# Patient Record
Sex: MALE | Race: OTHER | NOT HISPANIC OR LATINO | URBAN - METROPOLITAN AREA
[De-identification: names, ages, dates, MRNs, and addresses within clinical notes are randomized per-mention and may not be internally consistent; named-entity substitution may affect disease eponyms.]

---

## 2018-09-28 ENCOUNTER — EMERGENCY (EMERGENCY)
Facility: HOSPITAL | Age: 48
LOS: 1 days | Discharge: HOME | End: 2018-09-28
Admitting: EMERGENCY MEDICINE
Payer: COMMERCIAL

## 2018-09-28 VITALS
RESPIRATION RATE: 18 BRPM | TEMPERATURE: 98 F | WEIGHT: 149.91 LBS | SYSTOLIC BLOOD PRESSURE: 120 MMHG | HEART RATE: 72 BPM | OXYGEN SATURATION: 99 % | DIASTOLIC BLOOD PRESSURE: 83 MMHG

## 2018-09-28 DIAGNOSIS — S09.90XA UNSPECIFIED INJURY OF HEAD, INITIAL ENCOUNTER: ICD-10-CM

## 2018-09-28 DIAGNOSIS — S00.81XA ABRASION OF OTHER PART OF HEAD, INITIAL ENCOUNTER: ICD-10-CM
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
## 2018-09-28 PROCEDURE — 99283 EMERGENCY DEPT VISIT LOW MDM: CPT
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
## 2018-09-28 NOTE — ED ADULT NURSE NOTE - CHPI ED NUR SYMPTOMS NEG
no vomiting/no nausea/no blurred vision/no change in level of consciousness/no dizziness/no syncope/no weakness/no confusion/no seizure/no loss of consciousness

## 2018-09-28 NOTE — ED PROVIDER NOTE - NEUROLOGICAL, MLM
Patient is alert, oriented x person, place and time.  Cranial nerves 2-12 are intact.  Normal gait and speech.  Cerebellar testing normal:  negative Romberg, normal coordination and normal finger to nose, heal to shin and rapid alternating movements.  Normal sensory exam throughout.  No pronator drift.  5/5 bl upper extremity and lower extremity strength.

## 2018-09-28 NOTE — ED PROVIDER NOTE - CARE PROVIDERS DIRECT ADDRESSES
[email or web address]
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
## 2018-09-28 NOTE — ED PROVIDER NOTE - NSFOLLOWUPINSTRUCTIONS_ED_ALL_ED_FT
Please follow up with your primary care provider    Return immediately to ED if you have headache, nausea,  vomiting ,visual  changes, dizziness or any concerns.

## 2018-09-28 NOTE — ED PROVIDER NOTE - ENMT, MLM
Face: +abrasions to left side of face  Airway patent, Nasal mucosa clear. Mouth with normal mucosa. Throat has no vesicles, no oropharyngeal exudates and uvula is midline.

## 2022-07-12 NOTE — ED ADULT NURSE NOTE - BREATHING, MLM
[id number] Renee Bryan I added a BMP to check his sodium and kidney function right now. Pending other labs just done today by giovanny.  Will decide based on these
Advised patient of Dr. William Stark note. Patient verbalized understanding. Office visit made.     Future Appointments   Date Time Provider Peter Mccrary   7/13/2022  9:45 AM Howard Ward MD [facility]   7/13/2022 10:00 AM Mikaela Stern MD [facility]
Attempted to contact his. Mailbox full. To clarify, Dr. Dustin Bird, are we adding tomorrow at 10 am as double book? In case he calls back.     Please advise
Called patient's listed # and unable to leave a message due to full vm. I called his spouse listed and her phone rings multiple times, then goes to busy.  Patient doesn't read my charts
DONALDO Pt states that he has had several falls recently due to weakness and pain in his back coupled with myoclonic seizures. He states that he is black and blue all over. He gets dizzy and then just falls. He recently saw Dr. Kameron Patel and was advised there is nothing more they can do for his back pain as the injections don't work anymore. He also has seen dr. Conner Dorantes recently and was told there's nothing more they can do for his seizures. Pt is currently on Depakote. Pt has a medicare physical 08/31. No room for appts prior to that. IF you need to see him, please tell me where/when we can add him.
Labs were stable. You can add him to my schedule tomorrow at 10 am. He already has an appointment with Dr. Lieutenant Adrian.
The patient called and was informed. The BMP is completed. Please also look at his mychart  Picture of his arm. He stated when he go up he became dizzy and fell. Instructed to get up slowly.
Spontaneous, unlabored and symmetrical